# Patient Record
(demographics unavailable — no encounter records)

---

## 2024-12-16 NOTE — HISTORY OF PRESENT ILLNESS
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 She presented to the GYN with 2 episodes of vaginal bleeding. Pap smear revealed adenocarcinoma, (-) HRHPV.  She denies abdominal/pelvic pain, dyspnea or chest pain, nausea/vomiting, changes in bowel habits or urination, lower extremity edema or pain. She denies all other associated signs and symptoms. She is here to discuss further management.  CT C/A/P 5/1/23 FINDINGS: CHEST: LUNGS AND LARGE AIRWAYS: Patent central airways. Punctate right upper lobe nodule series 6/37. No dominant pulmonary nodule seen PLEURA: No pleural effusion. VESSELS: Within normal limits. HEART: Heart size is normal. No pericardial effusion. MEDIASTINUM AND MARK ANTHONY: No lymphadenopathy. CHEST WALL AND LOWER NECK: Within normal limits.  ABDOMEN AND PELVIS: LIVER: Within normal limits. BILE DUCTS: Normal caliber. GALLBLADDER: Cholecystectomy. SPLEEN: Within normal limits. PANCREAS: Within normal limits. ADRENALS: Within normal limits. KIDNEYS/URETERS: Within normal limits.  BLADDER: Within normal limits. REPRODUCTIVE ORGANS: Heterogeneous enlargement of the endometrial cavity is better delineated on previous ultrasound. The parametrium is intact.  BOWEL: No bowel obstruction. PERITONEUM: No ascites. Small nonenlarged mesenteric lymph nodes VESSELS: Within normal limits. RETROPERITONEUM/LYMPH NODES: No lymphadenopathy. ABDOMINAL WALL: Within normal limits. BONES: Degenerative changes.  IMPRESSION: Heterogeneity involving the endometrial cavity in a patient with known endometrial cancer but no evidence of metastatic disease  She is s/p Diagnostic laparoscopy, exploratory laparotomy, extensive enterolysis, pelvic lysis of adhesions, repair of colotomy, total abdominal hysterectomy and bilateral salpingo_oophorectomy, omentectomy, and pelvic lymph node dissection on 5/4/23 with Dr Reeves  Path 5/4/23 Final Diagnosis 1-Omental bx - Benign adipose tissue  2-Uterus, cervix, bilateral fallopian tubes and ovaries, JAYLA-BSO - Serous carcinoma, see comment and synoptic summary - Bilateral adnexa, unremarkable - Cervical epithelium and stroma is involved by tumor - Leiomyoma - Tumor is mostly limited to endometrium and endometrial polyp focal superficial myometrial invasion ( 3 of 1.3 cm ) is identified  3-Left pelvic sentinel lymph node, Excision - Two benign reactive lymph nodes ( H and E and AE1-AE3 immunostain)  4-Right pelvic lymph node, Excision - A benign reactive lymph node  HER-2 negative.        Completed 6 cycles of adjuvant Taxol and carboplatin November 2023 Doing well now, with no evidence of recurrence. CT chest and abdomen in February 2024 COY. [de-identified] : Monse comes in for follow up, feels well, no complaints.  She is able to eat and hydrate well, there are no changes in bowel or urinary patterns and she denies vaginal bleeding.  Denies pain, fatigue, dyspnea or neuralgias.  She mentions the passing of her daughter 2 years ago from cancer and her  currently undergoing cancer treatment.  She is appropriately grieving but coping very well and finds solace in her olive daily.  Well groomed, maintains daily caregiver and self-caring responsibilities, active in community [employer].

## 2025-01-14 NOTE — PHYSICAL EXAM
[Chaperone Present] : A chaperone was present in the examining room during all aspects of the physical examination [Absent] : Adnexa(ae): Absent [Normal] : Recto-Vaginal Exam: Normal [de-identified] : wound healed

## 2025-01-14 NOTE — HISTORY OF PRESENT ILLNESS
[FreeTextEntry1] : Stage II serous endometrial cancer  Exlap, JAYLA/BSO/PLND/omentectomy/extensive IVETTE 5/4/23 Carboplatin/Paclitaxel x 6 (completed 11/17/23) EBRT (completed 2/2024)  Last seen 3/2024  CT 10/2024 COY. CA-125 in 9/2024 in 8  No complaints today other than weight gain.  Denies abdominal/pelvic pain, dyspnea or chest pain, vaginal bleeding or discharge, nausea/vomiting, changes in bowel habits or urination, lower extremity edema or pain.

## 2025-03-21 NOTE — PHYSICAL EXAM
[80814] : A chaperone was present during the pelvic exam. [FreeTextEntry2] : Peña [de-identified] : wound healed

## 2025-03-21 NOTE — PHYSICAL EXAM
[[id number]] : A chaperone was present during the pelvic exam. [FreeTextEntry2] : Peña [de-identified] : wound healed

## 2025-03-21 NOTE — PHYSICAL EXAM
[[id number]] : A chaperone was present during the pelvic exam. [FreeTextEntry2] : Peña [de-identified] : wound healed

## 2025-03-21 NOTE — DISCUSSION/SUMMARY
[FreeTextEntry1] : Signs and symptoms of recurrence reviewed in detail, namely vaginal bleeding, abdominal pain, changes in bowel habits or urination, nausea/vomiting. F/u in 6 months or sooner prn Plan for CT imaging to evaluate etiology of pelvic pain

## 2025-03-21 NOTE — PHYSICAL EXAM
[[id number]] : A chaperone was present during the pelvic exam. [FreeTextEntry2] : Peña [de-identified] : wound healed

## 2025-03-21 NOTE — HISTORY OF PRESENT ILLNESS
[FreeTextEntry1] : Stage II serous endometrial cancer  Exlap, JAYLA/BSO/PLND/omentectomy/extensive IVETTE 5/4/23 Carboplatin/Paclitaxel x 6 (completed 11/17/23) EBRT (completed 2/2024)  Last seen 3/2024  CT 10/2024 COY. CA-125 in 9/2024 in 8  Reports weight gain.  Also intermittent pelvic discomfort.  Denies  dyspnea or chest pain, vaginal bleeding or discharge, nausea/vomiting, changes in bowel habits or urination, lower extremity edema or pain.  's health has been declining.

## 2025-05-09 NOTE — PHYSICAL EXAM
[Restricted in physically strenuous activity but ambulatory and able to carry out work of a light or sedentary nature] : Status 1- Restricted in physically strenuous activity but ambulatory and able to carry out work of a light or sedentary nature, e.g., light house work, office work [Obese] : obese [Normal] : affect appropriate [de-identified] : uses cane with walking

## 2025-05-09 NOTE — CONSULT LETTER
[Dear  ___] : Dear  [unfilled], [Courtesy Letter:] : I had the pleasure of seeing your patient, [unfilled], in my office today. [Please see my note below.] : Please see my note below. [Consult Closing:] : Thank you very much for allowing me to participate in the care of this patient.  If you have any questions, please do not hesitate to contact me. [Sincerely,] : Sincerely, [FreeTextEntry2] : Sanjeev James  \[facility] [place] [facility]\"" [address] [place] [id number] [FreeTextEntry3] : Cyrus Henderson MD Attending [facility]

## 2025-05-09 NOTE — ASSESSMENT
[FreeTextEntry1] : She is a 76 y/o  F with Stage II serous uterine endometrial carcinoma s/p exploratory laparatomy with JAYLA/ BSO with Dr Reeves on 5/4/2023. She had completed adjuvant carboplatin/ paclitaxel completed on 11/2023. She has been on surveillance and had recent CT imaging that showed vaginal nodular thickening and will be having follow up with Dr Reeves. We reviewed if proven recurrence, will see if there are any radiation options. We reviewed if there is proven recurrence without any local therapy, we reviewed platin based chemotherapy with dostarlimab treatment. We reviewed supportive measures to help her tolerate chemotherapy if retreat is needed. Questions answered to her satisfaction. She is agreeable with plan. If no systemic chemotherapy is needed, will continue to monitor her and will follow up in 3 months. She will continue to maintain port with port flushes. We reviewed social work support/ talk therapy to help with anxiety/ stress: currently she defers but she understands she can call if she changes her mind.

## 2025-05-09 NOTE — REVIEW OF SYSTEMS
[Fever] : no fever [Chills] : no chills [Night Sweats] : no night sweats [Fatigue] : fatigue [Recent Change In Weight] : ~T no recent weight change [Diarrhea: Grade 0] : Diarrhea: Grade 0 [Confused] : no confusion [Dizziness] : no dizziness [Fainting] : no fainting [Difficulty Walking] : difficulty walking [Suicidal] : not suicidal [Insomnia] : no insomnia [Anxiety] : anxiety [Depression] : no depression [Negative] : Allergic/Immunologic [FreeTextEntry2] : feels tired in the afternoon [de-identified] : baseline numbness over feet and unsteadiness

## 2025-05-09 NOTE — HISTORY OF PRESENT ILLNESS
[Disease: _____________________] : Disease: [unfilled] [T: ___] : T[unfilled] [N: ___] : N[unfilled] [AJCC Stage: ____] : AJCC Stage: [unfilled] [de-identified] : Age 75: serous endometrial carcinoma Symptomatic: she had 2 episodes of vaginal bleeding and had GYN evaluation:. Pap smear revealed adenocarcinoma, (-) HRHPV. She had CT of the abd/ pelvis done on 5/1/2023 which showed heterogeneity involving the endometrial cavity. She underwent diagnostic laparoscopy, exploratory laparotomy, extensive enterolysis, pelvic lysis of adhesions, repair of colotomy, total abdominal hysterectomy and bilateral salpingo-oophorectomy, omentectomy, and pelvic lymph node dissection with Dr Reeves on 5/4/2023. The pathology showed serous carcinoma mostly limited to endometrium and endometrial polyp with focal superficial myometrial invasion (3 of 1.3cm) and involving the cervical epithelium and stroma is involved by tumor; pelvic sentinel lymph node biopsy was negative left and benign reactive in the right. Completed 6 cycles of adjuvant Taxol and carboplatin November 2023 with Dr Arnold Ruvalcaba: had baseline neuropathy over the feet from diabetes. She has been on surveillance and has port flushes. Continuity of care for endometrial cancer on 5/7/2025.   [de-identified] : high grade serous endometrial carcinoma  [de-identified] : Completed 6 cycles of adjuvant Taxol and carboplatin November 2023 [de-identified] : She had recent CT of the chest/ abd/ pelvis done on 2025 which showed new nodular thickening of the left vaginal cuff concerning for recurrent disease. She denies any vaginal spotting or symptoms. She will be seeing Dr Reeves next Monday. Had initially lost weight but has regained 25 lbs. Has been under stress: mourning her daughter's passing, recently had good friend who was in good health pass and will go to his  tomorrow;  with myelofibrosis on continued treatment. Has good support from friends and [employer].

## 2025-05-09 NOTE — REASON FOR VISIT
[Follow-Up Visit] : a follow-up [FreeTextEntry2] : continuity of care for serous endometrial carcinoma

## 2025-05-12 NOTE — DISCUSSION/SUMMARY
[FreeTextEntry1] : CT finding not appreciable on exam Will see if amenable to sono guided biopsy If not, will recommend short interval CT imaging

## 2025-05-12 NOTE — HISTORY OF PRESENT ILLNESS
[FreeTextEntry1] : Stage II serous endometrial cancer  Exlap, JAYLA/BSO/PLND/omentectomy/extensive IVETTE 5/4/23 Carboplatin/Paclitaxel x 6 (completed 11/17/23) EBRT (completed 2/2024)  Last seen 3/2025  CT 4/2025 with new nodular thickening of the left vaginal cuff, concerning for recurrent disease CA-125 in 5/2025 in 13  Reports weight gain.  Denies  dyspnea or chest pain, vaginal bleeding or discharge, nausea/vomiting, changes in bowel habits or urination, lower extremity edema or pain.  's health has been declining.

## 2025-05-29 NOTE — HISTORY OF PRESENT ILLNESS
[FreeTextEntry1] : Stage II serous endometrial cancer  Exlap, JAYLA/BSO/PLND/omentectomy/extensive IVETTE 5/4/23 Carboplatin/Paclitaxel x 6 (completed 11/17/23) EBRT (completed 2/2024)  Last seen 3/2025  CT 4/2025 with new nodular thickening of the left vaginal cuff, concerning for recurrent disease CA-125 in 5/2025 in 13 US guided biopsy of vaginal cuff thickening on 5/22/25  [] PATHOLOGY

## 2025-07-10 NOTE — PHYSICAL EXAM
[Ambulatory and capable of all self care but unable to carry out any work activities] : Status 2- Ambulatory and capable of all self care but unable to carry out any work activities. Up and about more than 50% of waking hours [Obese] : obese [Normal] : affect appropriate [de-identified] : RUE in plastic cast [de-identified] : uses cane with walking

## 2025-07-10 NOTE — REASON FOR VISIT
[Follow-Up Visit] : a follow-up [Family Member] : family member [FreeTextEntry2] : follow up for recurrent endometrial carcinoma

## 2025-07-10 NOTE — ASSESSMENT
[FreeTextEntry1] : She is a 77 y/o  F with Stage II serous uterine endometrial carcinoma s/p exploratory laparatomy with JAYLA/ BSO with Dr Reeves on 5/4/2023. She had completed adjuvant carboplatin/ paclitaxel completed on 11/2023. Had interval CT showing vaginal cuff nodularity with confirmation on biopsy for recurrent serous endometrial carcinoma. We reviewed her pathology that is Her 2 negative. We reviewed retreat carboplatin/ paclitaxel/ dostarlimab as per the ABAD trial for recurrent endometrial carcinoma. We reviewed her performance status and review of how chemotherapy could affect her independence / energy at home. We reviewed SW to see if there is any ability for insurance to approve additional help but also [employer] for meals. We reviewed goals of chemotherapy treatment: palliative and not curative. We reviewed potential side effects including but not limited to: fatigue, increased risk of infection, low blood counts, numbness and tingling of the hands and feet, allergic reaction, bone pain/ muscle pain, changes in sodium/ potassium/ magnesium levels, nausea/ vomiting, constipation, diarrhea, and hair loss. We reviewed supportive measures to decrease these side effects. We reviewed precautions that would be taken to decrease infection. Reviewed potential I/O induced AE including but not limited to thyroid low levels requiring supplementation, inflammation of the lungs, colon or liver requiring steroid treatment. Written information about the chemotherapy was given to her. We explained that the chemotherapy could be stopped at any time. We also explained that if the chemotherapy is not improving her symptoms or causing more side effects than benefit; the treatment would be discontinued. Will see if chemotherapy could be started 7/22/2025 to allow time for her to have orthopedic follow up for the R humeral fracture. Questions answered to her and her daughter's satisfaction. They are agreeable with plan.

## 2025-07-10 NOTE — HISTORY OF PRESENT ILLNESS
[Disease: _____________________] : Disease: [unfilled] [T: ___] : T[unfilled] [N: ___] : N[unfilled] [AJCC Stage: ____] : AJCC Stage: [unfilled] [de-identified] : Age 75: serous endometrial carcinoma Symptomatic: she had 2 episodes of vaginal bleeding and had GYN evaluation:. Pap smear revealed adenocarcinoma, (-) HRHPV. She had CT of the abd/ pelvis done on 5/1/2023 which showed heterogeneity involving the endometrial cavity. She underwent diagnostic laparoscopy, exploratory laparotomy, extensive enterolysis, pelvic lysis of adhesions, repair of colotomy, total abdominal hysterectomy and bilateral salpingo-oophorectomy, omentectomy, and pelvic lymph node dissection with Dr Reeves on 5/4/2023. The pathology showed serous carcinoma mostly limited to endometrium and endometrial polyp with focal superficial myometrial invasion (3 of 1.3cm) and involving the cervical epithelium and stroma is involved by tumor; pelvic sentinel lymph node biopsy was negative left and benign reactive in the right. Completed 6 cycles of adjuvant Taxol and carboplatin November 2023 with Dr Arnold Ruvalcaba: had baseline neuropathy over the feet from diabetes. She has been on surveillance and has port flushes. Continuity of care for endometrial cancer on 5/7/2025. She had interval CT of the chest/abd/ pelvis on 4/2025 that showed new nodular thickening of the left vaginal cuff concerning for recurrent disease. She had left vaginal cuff biopsy that was positive for malignant cells: Muellarian primary and Her2 FISH nonamplified. No additional RT recommended from Dr Arango. She fell and had R humeral fracture: followed by Dr Whit Rivera at [facility].   [de-identified] : high grade serous endometrial carcinoma  [de-identified] : Completed 6 cycles of adjuvant Taxol and carboplatin November 2023 [de-identified] : She will be seeing Dr Rivera again mid July: she is out of rehab and back home. She uses her left hand to hold cane and cannot put pressure over the R arm yet. She denies any new vaginal bleeding but would like to start on therapy. Currently her  is in hospital for myelofibrosis. She currently has aide who comes 2 to 4pm for 2 hours for 5 days/ week. Her friends come later in the evening to help her at home. Denies any falls or unsteadiness.

## 2025-07-10 NOTE — REVIEW OF SYSTEMS
[Diarrhea: Grade 0] : Diarrhea: Grade 0 [Joint Pain] : joint pain [Joint Stiffness] : no joint stiffness [Muscle Pain] : no muscle pain [Muscle Weakness] : no muscle weakness [Confused] : no confusion [Dizziness] : no dizziness [Fainting] : no fainting [Difficulty Walking] : no difficulty walking [Negative] : Allergic/Immunologic [FreeTextEntry9] : R humeral fracture wearing cast  [de-identified] : uses cane to walk

## 2025-07-10 NOTE — CONSULT LETTER
[Dear  ___] : Dear  [unfilled], [Courtesy Letter:] : I had the pleasure of seeing your patient, [unfilled], in my office today. [Please see my note below.] : Please see my note below. [Consult Closing:] : Thank you very much for allowing me to participate in the care of this patient.  If you have any questions, please do not hesitate to contact me. [Sincerely,] : Sincerely, [FreeTextEntry2] : Sanjeev James  [facility] [address] [place] [phone]   [FreeTextEntry3] : Cyrus Henderson MD Attending [facility]  [DrMora  ___] : Dr. PALACIOS